# Patient Record
Sex: FEMALE
[De-identification: names, ages, dates, MRNs, and addresses within clinical notes are randomized per-mention and may not be internally consistent; named-entity substitution may affect disease eponyms.]

---
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
## 2022-06-28 ENCOUNTER — HOSPITAL ENCOUNTER (OUTPATIENT)
Age: 22
Discharge: HOME | End: 2022-06-28
Payer: COMMERCIAL

## 2022-06-28 VITALS
TEMPERATURE: 98 F | OXYGEN SATURATION: 100 % | BODY MASS INDEX: 23.6 KG/M2 | SYSTOLIC BLOOD PRESSURE: 116 MMHG | WEIGHT: 125 LBS | DIASTOLIC BLOOD PRESSURE: 74 MMHG | RESPIRATION RATE: 20 BRPM | HEART RATE: 71 BPM | HEIGHT: 61 IN

## 2022-06-28 DIAGNOSIS — J01.90 ACUTE NON-RECURRENT SINUSITIS, UNSPECIFIED LOCATION: Primary | ICD-10-CM

## 2022-06-28 PROCEDURE — 99203 OFFICE O/P NEW LOW 30 MIN: CPT | Performed by: NURSE PRACTITIONER

## 2022-06-28 RX ORDER — DOXYCYCLINE HYCLATE 100 MG/1
100 CAPSULE ORAL 2 TIMES DAILY
Qty: 20 CAPSULE | Refills: 0 | Status: SHIPPED | OUTPATIENT
Start: 2022-06-28 | End: 2022-07-08

## 2022-06-28 NOTE — ED QUICK NOTES
Pt is tearful on entering room to provide dc instructions. Paperwork reviewed with recommendations, pt begins sobbing saying that she is tired of feeling sick, she has been sick for 1 month already. I empathized with pt and offered her a warm pack and tissue. I ensured pt felt safe at home. She agreed. Mother was here with pt in the lobby waiting area. I gave pt a moment to gather herself and let her know that when she was ready she could go or if she needed anything further to let us know.

## 2022-06-28 NOTE — ED INITIAL ASSESSMENT (HPI)
Pt with fatigue ha light sensitivity neck and back pain. Was treated recently for sinus infection but is not getting better. Pt wants to r/o meningitis.